# Patient Record
Sex: MALE | Race: WHITE | NOT HISPANIC OR LATINO
[De-identification: names, ages, dates, MRNs, and addresses within clinical notes are randomized per-mention and may not be internally consistent; named-entity substitution may affect disease eponyms.]

---

## 2023-03-16 PROBLEM — Z00.00 ENCOUNTER FOR PREVENTIVE HEALTH EXAMINATION: Status: ACTIVE | Noted: 2023-03-16

## 2023-03-22 RX ORDER — THEOPHYLLINE ANHYDROUS 400 MG/1
400 CAPSULE, EXTENDED RELEASE ORAL
Refills: 0 | Status: ACTIVE | COMMUNITY

## 2023-03-22 RX ORDER — PREDNISONE 10 MG/1
10 TABLET ORAL
Refills: 0 | Status: ACTIVE | COMMUNITY

## 2023-03-23 ENCOUNTER — APPOINTMENT (OUTPATIENT)
Dept: PULMONOLOGY | Facility: CLINIC | Age: 73
End: 2023-03-23
Payer: MEDICARE

## 2023-03-23 VITALS
HEART RATE: 105 BPM | HEIGHT: 76 IN | DIASTOLIC BLOOD PRESSURE: 68 MMHG | OXYGEN SATURATION: 93 % | SYSTOLIC BLOOD PRESSURE: 106 MMHG | WEIGHT: 255 LBS | BODY MASS INDEX: 31.05 KG/M2

## 2023-03-23 DIAGNOSIS — J96.11 CHRONIC RESPIRATORY FAILURE WITH HYPOXIA: ICD-10-CM

## 2023-03-23 DIAGNOSIS — J44.9 CHRONIC OBSTRUCTIVE PULMONARY DISEASE, UNSPECIFIED: ICD-10-CM

## 2023-03-23 DIAGNOSIS — Z99.81 CHRONIC RESPIRATORY FAILURE WITH HYPOXIA: ICD-10-CM

## 2023-03-23 DIAGNOSIS — R06.02 SHORTNESS OF BREATH: ICD-10-CM

## 2023-03-23 DIAGNOSIS — Z87.891 PERSONAL HISTORY OF NICOTINE DEPENDENCE: ICD-10-CM

## 2023-03-23 PROCEDURE — 99213 OFFICE O/P EST LOW 20 MIN: CPT

## 2023-03-23 RX ORDER — ALBUTEROL SULFATE 2.5 MG/3ML
(2.5 MG/3ML) SOLUTION RESPIRATORY (INHALATION)
Qty: 3 | Refills: 5 | Status: ACTIVE | COMMUNITY
Start: 2023-03-23 | End: 1900-01-01

## 2023-03-23 RX ORDER — THEOPHYLLINE ANHYDROUS 400 MG/1
400 CAPSULE, EXTENDED RELEASE ORAL DAILY
Qty: 90 | Refills: 3 | Status: ACTIVE | COMMUNITY
Start: 2023-03-23 | End: 1900-01-01

## 2023-03-23 NOTE — HISTORY OF PRESENT ILLNESS
[Former] : former [TextBox_4] : Mr. Johnston is a 72M here for management of COPD c/b chronic hypoxemic respiratory failure. He was previously following with Dr. Machuac.\par \par Today Pt feels well but is out of a few of his medications so needs refills. No recent exacerbations or hospitalizations.\par \par Current regimen: Treley, albuterol HFA (uses daily), azithro TIW, prednisone 10mg, theophylline 400mg\par \par He reports he uses rescue inhaler daily and often runs out by the end of the month. He also has had improvement since starting chronic prednisone and occasionally needs to take 12.5mg to control his breathing. He has a daily chronic cough that is productive and finds that smoking marijuana helps and his home percussion vest helps to bring up sputum the best. Denies fevers, chills, hemoptysis, chest pain, syncope.

## 2023-03-23 NOTE — DISCUSSION/SUMMARY
[FreeTextEntry1] : 72M former smoker here for COPD management\par \par #COPD\par - Severe COPD with O2 dependence. Has undergon Pulm rehab ([facility]) in the past\par - Current regimen appears maxed out. Concern for continued theophylline use but Pt reports he does not want to deviate from current regimen as it's kept him stable. Will discuss checking regina levels in the future\par - home PT referral placed\par \par #Nicotine dependence, in remission\par - Last CT chest in 2021 showing centrilobular emphysema and calcified 6mm nodule. Will need repeat CT chest this year for lung ca screening\par \par RTC in 2-3 months

## 2023-03-23 NOTE — PHYSICAL EXAM
[No Acute Distress] : no acute distress [Normal Rate/Rhythm] : normal rate/rhythm [Normal S1, S2] : normal s1, s2 [No Murmurs] : no murmurs [No Resp Distress] : no resp distress [No Clubbing] : no clubbing [No Edema] : no edema [No Focal Deficits] : no focal deficits [Oriented x3] : oriented x3 [Normal Affect] : normal affect [TextBox_68] : diminished breath sounds bilaterally

## 2023-04-10 ENCOUNTER — NON-APPOINTMENT (OUTPATIENT)
Age: 73
End: 2023-04-10

## 2023-04-10 RX ORDER — THEOPHYLLINE ANHYDROUS 400 MG/1
400 CAPSULE, EXTENDED RELEASE ORAL
Qty: 90 | Refills: 3 | Status: ACTIVE | COMMUNITY
Start: 2023-04-10 | End: 1900-01-01

## 2023-04-17 ENCOUNTER — NON-APPOINTMENT (OUTPATIENT)
Age: 73
End: 2023-04-17

## 2023-04-24 ENCOUNTER — NON-APPOINTMENT (OUTPATIENT)
Age: 73
End: 2023-04-24

## 2023-04-25 ENCOUNTER — NON-APPOINTMENT (OUTPATIENT)
Age: 73
End: 2023-04-25

## 2023-06-30 ENCOUNTER — NON-APPOINTMENT (OUTPATIENT)
Age: 73
End: 2023-06-30

## 2023-09-21 ENCOUNTER — NON-APPOINTMENT (OUTPATIENT)
Age: 73
End: 2023-09-21

## 2024-01-08 ENCOUNTER — RX RENEWAL (OUTPATIENT)
Age: 74
End: 2024-01-08

## 2024-01-31 RX ORDER — FLUTICASONE FUROATE, UMECLIDINIUM BROMIDE AND VILANTEROL TRIFENATATE 200; 62.5; 25 UG/1; UG/1; UG/1
200-62.5-25 POWDER RESPIRATORY (INHALATION)
Qty: 3 | Refills: 3 | Status: ACTIVE | COMMUNITY
Start: 1900-01-01 | End: 1900-01-01

## 2024-04-09 ENCOUNTER — RX RENEWAL (OUTPATIENT)
Age: 74
End: 2024-04-09

## 2024-04-09 RX ORDER — AZITHROMYCIN 250 MG/1
250 TABLET, FILM COATED ORAL
Qty: 36 | Refills: 3 | Status: ACTIVE | COMMUNITY
Start: 1900-01-01 | End: 1900-01-01

## 2024-04-10 RX ORDER — PREDNISONE 10 MG/1
10 TABLET ORAL DAILY
Qty: 90 | Refills: 3 | Status: ACTIVE | COMMUNITY
Start: 2023-03-23 | End: 1900-01-01

## 2024-04-30 RX ORDER — THEOPHYLLINE 400 MG/1
400 TABLET, EXTENDED RELEASE ORAL DAILY
Qty: 90 | Refills: 3 | Status: ACTIVE | COMMUNITY
Start: 2023-04-17 | End: 1900-01-01

## 2024-06-18 ENCOUNTER — RX RENEWAL (OUTPATIENT)
Age: 74
End: 2024-06-18

## 2024-06-18 RX ORDER — ALBUTEROL SULFATE 90 UG/1
108 (90 BASE) INHALANT RESPIRATORY (INHALATION)
Qty: 26.8 | Refills: 3 | Status: ACTIVE | COMMUNITY
Start: 2023-03-23 | End: 1900-01-01

## 2024-07-24 ENCOUNTER — APPOINTMENT (OUTPATIENT)
Dept: PULMONOLOGY | Facility: CLINIC | Age: 74
End: 2024-07-24
Payer: MEDICARE

## 2024-07-24 DIAGNOSIS — Z99.81 CHRONIC RESPIRATORY FAILURE WITH HYPOXIA: ICD-10-CM

## 2024-07-24 DIAGNOSIS — J44.89 OTHER SPECIFIED CHRONIC OBSTRUCTIVE PULMONARY DISEASE: ICD-10-CM

## 2024-07-24 DIAGNOSIS — R06.02 SHORTNESS OF BREATH: ICD-10-CM

## 2024-07-24 DIAGNOSIS — J96.11 CHRONIC RESPIRATORY FAILURE WITH HYPOXIA: ICD-10-CM

## 2024-07-24 PROCEDURE — 99442: CPT | Mod: 93

## 2024-07-24 NOTE — HISTORY OF PRESENT ILLNESS
[Former] : former [TextBox_4] : Mr. Johnston is a 72M here for management of COPD c/b chronic hypoxemic respiratory failure. He was previously following with Dr. Machuca.  Today Pt feels well but is out of a few of his medications so needs refills. No recent exacerbations or hospitalizations.  Current regimen: Treley, albuterol HFA (uses daily), azithro TIW, prednisone 10mg, theophylline 400mg  He reports he uses rescue inhaler daily and often runs out by the end of the month. He also has had improvement since starting chronic prednisone and occasionally needs to take 12.5mg to control his breathing. He has a daily chronic cough that is productive and finds that smoking marijuana helps and his home percussion vest helps to bring up sputum the best. Denies fevers, chills, hemoptysis, chest pain, syncope.  7/2024 - TELEHEALTH VISIT Mr. JOHNSTON returns today for audio-only televisit for routine COPD follow-up. He feels well overall when at rest, but still with significant exertional shortness of breath that has not changed significantly over the past few months. No changes to his COPD regimen since our last visit. He remains on Theophylline, Treley, Azithro TIW, and prednisone 5mg daily. No recent exacerbations. He is not using nebulizer machine as much as he finds good response with albuterol HFA inhaler. He uses it every time he uses his chest vest with good results. He is very excited for the birth of his grandson next week.

## 2024-07-24 NOTE — DISCUSSION/SUMMARY
[FreeTextEntry1] : 73M former smoker here for COPD management  #COPD #Nicotine dependence, in remission - Severe COPD with O2 dependence. Has undergone Pulm rehab ([facility]) in the past and defers repeat testing or another rehab referral - Last CT chest in 2021 showing centrilobular emphysema and calcified 6mm nodule. Will need repeat CT chest this year for lung ca screening but he declines further screening scans at this time. - He has trouble leaving his home due to mobility issues as well as coordinating with his home aide schedule. He prefers to have televisits if possible. - He does go to see his PCP annually in Oct for his annual physical. I have asked his PCP Dr. Russell to order theophylline level at next visit.

## 2024-08-29 ENCOUNTER — NON-APPOINTMENT (OUTPATIENT)
Age: 74
End: 2024-08-29

## 2025-01-06 ENCOUNTER — RX RENEWAL (OUTPATIENT)
Age: 75
End: 2025-01-06

## 2025-04-03 ENCOUNTER — RX RENEWAL (OUTPATIENT)
Age: 75
End: 2025-04-03

## 2025-04-14 ENCOUNTER — APPOINTMENT (OUTPATIENT)
Dept: PULMONOLOGY | Facility: CLINIC | Age: 75
End: 2025-04-14
Payer: MEDICARE

## 2025-04-14 DIAGNOSIS — J44.89 OTHER SPECIFIED CHRONIC OBSTRUCTIVE PULMONARY DISEASE: ICD-10-CM

## 2025-04-14 DIAGNOSIS — J96.11 CHRONIC RESPIRATORY FAILURE WITH HYPOXIA: ICD-10-CM

## 2025-04-14 DIAGNOSIS — Z99.81 CHRONIC RESPIRATORY FAILURE WITH HYPOXIA: ICD-10-CM

## 2025-04-14 PROCEDURE — 99213 OFFICE O/P EST LOW 20 MIN: CPT | Mod: 2W

## 2025-05-07 ENCOUNTER — RX RENEWAL (OUTPATIENT)
Age: 75
End: 2025-05-07

## 2025-05-22 ENCOUNTER — APPOINTMENT (OUTPATIENT)
Dept: PULMONOLOGY | Facility: CLINIC | Age: 75
End: 2025-05-22

## 2025-05-22 DIAGNOSIS — R06.02 SHORTNESS OF BREATH: ICD-10-CM

## 2025-05-22 DIAGNOSIS — J96.11 CHRONIC RESPIRATORY FAILURE WITH HYPOXIA: ICD-10-CM

## 2025-05-22 DIAGNOSIS — J44.89 OTHER SPECIFIED CHRONIC OBSTRUCTIVE PULMONARY DISEASE: ICD-10-CM

## 2025-05-22 DIAGNOSIS — Z99.81 CHRONIC RESPIRATORY FAILURE WITH HYPOXIA: ICD-10-CM

## 2025-05-22 PROCEDURE — 99212 OFFICE O/P EST SF 10 MIN: CPT | Mod: 93

## 2025-07-24 ENCOUNTER — APPOINTMENT (OUTPATIENT)
Dept: PULMONOLOGY | Facility: CLINIC | Age: 75
End: 2025-07-24
Payer: MEDICARE

## 2025-07-24 DIAGNOSIS — Z99.81 CHRONIC RESPIRATORY FAILURE WITH HYPOXIA: ICD-10-CM

## 2025-07-24 DIAGNOSIS — J44.89 OTHER SPECIFIED CHRONIC OBSTRUCTIVE PULMONARY DISEASE: ICD-10-CM

## 2025-07-24 DIAGNOSIS — J96.11 CHRONIC RESPIRATORY FAILURE WITH HYPOXIA: ICD-10-CM

## 2025-07-24 PROCEDURE — 99212 OFFICE O/P EST SF 10 MIN: CPT | Mod: 93
